# Patient Record
Sex: FEMALE | Race: BLACK OR AFRICAN AMERICAN | NOT HISPANIC OR LATINO | Employment: STUDENT | URBAN - METROPOLITAN AREA
[De-identification: names, ages, dates, MRNs, and addresses within clinical notes are randomized per-mention and may not be internally consistent; named-entity substitution may affect disease eponyms.]

---

## 2024-07-12 ENCOUNTER — APPOINTMENT (OUTPATIENT)
Dept: RADIOLOGY | Facility: HOSPITAL | Age: 29
End: 2024-07-12
Payer: COMMERCIAL

## 2024-07-12 ENCOUNTER — HOSPITAL ENCOUNTER (EMERGENCY)
Facility: HOSPITAL | Age: 29
Discharge: HOME | End: 2024-07-13
Attending: EMERGENCY MEDICINE
Payer: COMMERCIAL

## 2024-07-12 DIAGNOSIS — M89.8X6 PAIN IN LEFT TIBIA: ICD-10-CM
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
## 2024-07-12 LAB
ABO GROUP (TYPE) IN BLOOD: NORMAL
ALBUMIN SERPL BCP-MCNC: 3.7 G/DL (ref 3.4–5)
ALP SERPL-CCNC: 51 U/L (ref 33–110)
ALT SERPL W P-5'-P-CCNC: 9 U/L (ref 7–45)
ANION GAP SERPL CALC-SCNC: 11 MMOL/L (ref 10–20)
ANTIBODY SCREEN: NORMAL
APTT PPP: 28 SECONDS (ref 27–38)
AST SERPL W P-5'-P-CCNC: 13 U/L (ref 9–39)
BASOPHILS # BLD AUTO: 0.02 X10*3/UL (ref 0–0.1)
BASOPHILS NFR BLD AUTO: 0.3 %
BILIRUB SERPL-MCNC: 0.4 MG/DL (ref 0–1.2)
BUN SERPL-MCNC: 12 MG/DL (ref 6–23)
CALCIUM SERPL-MCNC: 8.7 MG/DL (ref 8.6–10.6)
CHLORIDE SERPL-SCNC: 104 MMOL/L (ref 98–107)
CO2 SERPL-SCNC: 27 MMOL/L (ref 21–32)
CREAT SERPL-MCNC: 1 MG/DL (ref 0.5–1.05)
EGFRCR SERPLBLD CKD-EPI 2021: 79 ML/MIN/1.73M*2
EOSINOPHIL # BLD AUTO: 0.12 X10*3/UL (ref 0–0.7)
EOSINOPHIL NFR BLD AUTO: 1.7 %
ERYTHROCYTE [DISTWIDTH] IN BLOOD BY AUTOMATED COUNT: 13.8 % (ref 11.5–14.5)
GLUCOSE SERPL-MCNC: 107 MG/DL (ref 74–99)
HCT VFR BLD AUTO: 37.3 % (ref 36–46)
HGB BLD-MCNC: 12.7 G/DL (ref 12–16)
IMM GRANULOCYTES # BLD AUTO: 0.01 X10*3/UL (ref 0–0.7)
IMM GRANULOCYTES NFR BLD AUTO: 0.1 % (ref 0–0.9)
INR PPP: 1.1 (ref 0.9–1.1)
LYMPHOCYTES # BLD AUTO: 3.59 X10*3/UL (ref 1.2–4.8)
LYMPHOCYTES NFR BLD AUTO: 51.9 %
MCH RBC QN AUTO: 26.9 PG (ref 26–34)
MCHC RBC AUTO-ENTMCNC: 34 G/DL (ref 32–36)
MCV RBC AUTO: 79 FL (ref 80–100)
MONOCYTES # BLD AUTO: 0.77 X10*3/UL (ref 0.1–1)
MONOCYTES NFR BLD AUTO: 11.1 %
NEUTROPHILS # BLD AUTO: 2.41 X10*3/UL (ref 1.2–7.7)
NEUTROPHILS NFR BLD AUTO: 34.9 %
NRBC BLD-RTO: 0 /100 WBCS (ref 0–0)
PLATELET # BLD AUTO: 335 X10*3/UL (ref 150–450)
POTASSIUM SERPL-SCNC: 3.5 MMOL/L (ref 3.5–5.3)
PROT SERPL-MCNC: 7 G/DL (ref 6.4–8.2)
PROTHROMBIN TIME: 12.2 SECONDS (ref 9.8–12.8)
RBC # BLD AUTO: 4.72 X10*6/UL (ref 4–5.2)
RH FACTOR (ANTIGEN D): NORMAL
SODIUM SERPL-SCNC: 138 MMOL/L (ref 136–145)
WBC # BLD AUTO: 6.9 X10*3/UL (ref 4.4–11.3)

## 2024-07-12 PROCEDURE — 73590 X-RAY EXAM OF LOWER LEG: CPT | Mod: LEFT SIDE | Performed by: RADIOLOGY

## 2024-07-12 PROCEDURE — 2500000005 HC RX 250 GENERAL PHARMACY W/O HCPCS: Mod: SE

## 2024-07-12 PROCEDURE — 73590 X-RAY EXAM OF LOWER LEG: CPT | Mod: LT

## 2024-07-12 PROCEDURE — 73552 X-RAY EXAM OF FEMUR 2/>: CPT | Mod: LT

## 2024-07-12 PROCEDURE — 2550000001 HC RX 255 CONTRASTS: Mod: SE | Performed by: EMERGENCY MEDICINE

## 2024-07-12 PROCEDURE — 73610 X-RAY EXAM OF ANKLE: CPT | Mod: LT

## 2024-07-12 PROCEDURE — 36415 COLL VENOUS BLD VENIPUNCTURE: CPT

## 2024-07-12 PROCEDURE — 2500000004 HC RX 250 GENERAL PHARMACY W/ HCPCS (ALT 636 FOR OP/ED): Mod: SE

## 2024-07-12 PROCEDURE — 99284 EMERGENCY DEPT VISIT MOD MDM: CPT | Mod: 25

## 2024-07-12 PROCEDURE — 85025 COMPLETE CBC W/AUTO DIFF WBC: CPT

## 2024-07-12 PROCEDURE — 96374 THER/PROPH/DIAG INJ IV PUSH: CPT

## 2024-07-12 PROCEDURE — 73610 X-RAY EXAM OF ANKLE: CPT | Mod: LEFT SIDE | Performed by: RADIOLOGY

## 2024-07-12 PROCEDURE — 96375 TX/PRO/DX INJ NEW DRUG ADDON: CPT

## 2024-07-12 PROCEDURE — 73564 X-RAY EXAM KNEE 4 OR MORE: CPT | Mod: LT

## 2024-07-12 PROCEDURE — 86850 RBC ANTIBODY SCREEN: CPT

## 2024-07-12 PROCEDURE — 73552 X-RAY EXAM OF FEMUR 2/>: CPT | Mod: LEFT SIDE | Performed by: RADIOLOGY

## 2024-07-12 PROCEDURE — 2500000001 HC RX 250 WO HCPCS SELF ADMINISTERED DRUGS (ALT 637 FOR MEDICARE OP): Mod: SE

## 2024-07-12 PROCEDURE — 80053 COMPREHEN METABOLIC PANEL: CPT

## 2024-07-12 PROCEDURE — 73706 CT ANGIO LWR EXTR W/O&W/DYE: CPT | Mod: LT

## 2024-07-12 PROCEDURE — 73564 X-RAY EXAM KNEE 4 OR MORE: CPT | Mod: LEFT SIDE | Performed by: RADIOLOGY

## 2024-07-12 PROCEDURE — 96376 TX/PRO/DX INJ SAME DRUG ADON: CPT

## 2024-07-12 PROCEDURE — 85610 PROTHROMBIN TIME: CPT

## 2024-07-12 RX ORDER — HYDROMORPHONE HYDROCHLORIDE 1 MG/ML
1 INJECTION, SOLUTION INTRAMUSCULAR; INTRAVENOUS; SUBCUTANEOUS ONCE
Status: COMPLETED | OUTPATIENT
Start: 2024-07-12 | End: 2024-07-12

## 2024-07-12 RX ORDER — KETOROLAC TROMETHAMINE 15 MG/ML
15 INJECTION, SOLUTION INTRAMUSCULAR; INTRAVENOUS ONCE
Status: COMPLETED | OUTPATIENT
Start: 2024-07-12 | End: 2024-07-12

## 2024-07-12 RX ORDER — HYDROXYZINE HYDROCHLORIDE 25 MG/1
25 TABLET, FILM COATED ORAL ONCE
Status: COMPLETED | OUTPATIENT
Start: 2024-07-12 | End: 2024-07-12

## 2024-07-12 RX ORDER — ONDANSETRON HYDROCHLORIDE 2 MG/ML
4 INJECTION, SOLUTION INTRAVENOUS ONCE AS NEEDED
Status: COMPLETED | OUTPATIENT
Start: 2024-07-12 | End: 2024-07-12

## 2024-07-12 RX ORDER — MORPHINE SULFATE 4 MG/ML
4 INJECTION INTRAVENOUS ONCE
Status: COMPLETED | OUTPATIENT
Start: 2024-07-12 | End: 2024-07-12

## 2024-07-12 RX ORDER — METHOCARBAMOL 100 MG/ML
1000 INJECTION, SOLUTION INTRAMUSCULAR; INTRAVENOUS ONCE
Status: COMPLETED | OUTPATIENT
Start: 2024-07-12 | End: 2024-07-12

## 2024-07-12 RX ORDER — ACETAMINOPHEN 325 MG/1
975 TABLET ORAL ONCE
Status: COMPLETED | OUTPATIENT
Start: 2024-07-12 | End: 2024-07-12

## 2024-07-12 RX ORDER — LIDOCAINE 560 MG/1
1 PATCH PERCUTANEOUS; TOPICAL; TRANSDERMAL ONCE
Status: COMPLETED | OUTPATIENT
Start: 2024-07-12 | End: 2024-07-13

## 2024-07-12 RX ADMIN — IOHEXOL 90 ML: 350 INJECTION, SOLUTION INTRAVENOUS at 21:36

## 2024-07-12 RX ADMIN — HYDROMORPHONE HYDROCHLORIDE 1 MG: 1 INJECTION, SOLUTION INTRAMUSCULAR; INTRAVENOUS; SUBCUTANEOUS at 16:19

## 2024-07-12 RX ADMIN — MORPHINE SULFATE 4 MG: 4 INJECTION, SOLUTION INTRAMUSCULAR; INTRAVENOUS at 13:58

## 2024-07-12 RX ADMIN — ACETAMINOPHEN 975 MG: 325 TABLET ORAL at 16:19

## 2024-07-12 RX ADMIN — METHOCARBAMOL 1000 MG: 100 INJECTION INTRAMUSCULAR; INTRAVENOUS at 20:10

## 2024-07-12 RX ADMIN — HYDROMORPHONE HYDROCHLORIDE 1 MG: 1 INJECTION, SOLUTION INTRAMUSCULAR; INTRAVENOUS; SUBCUTANEOUS at 20:10

## 2024-07-12 RX ADMIN — LIDOCAINE 1 PATCH: 4 PATCH TOPICAL at 23:00

## 2024-07-12 RX ADMIN — HYDROXYZINE HYDROCHLORIDE 25 MG: 25 TABLET ORAL at 20:19

## 2024-07-12 RX ADMIN — HYDROMORPHONE HYDROCHLORIDE 1 MG: 1 INJECTION, SOLUTION INTRAMUSCULAR; INTRAVENOUS; SUBCUTANEOUS at 22:59

## 2024-07-12 RX ADMIN — ONDANSETRON 4 MG: 2 INJECTION INTRAMUSCULAR; INTRAVENOUS at 13:58

## 2024-07-12 RX ADMIN — KETOROLAC TROMETHAMINE 15 MG: 15 INJECTION, SOLUTION INTRAMUSCULAR; INTRAVENOUS at 22:59

## 2024-07-12 ASSESSMENT — LIFESTYLE VARIABLES
HAVE PEOPLE ANNOYED YOU BY CRITICIZING YOUR DRINKING: NO
EVER FELT BAD OR GUILTY ABOUT YOUR DRINKING: NO
HAVE YOU EVER FELT YOU SHOULD CUT DOWN ON YOUR DRINKING: NO
EVER HAD A DRINK FIRST THING IN THE MORNING TO STEADY YOUR NERVES TO GET RID OF A HANGOVER: NO
TOTAL SCORE: 0

## 2024-07-12 ASSESSMENT — PAIN DESCRIPTION - LOCATION: LOCATION: KNEE

## 2024-07-12 ASSESSMENT — PAIN SCALES - GENERAL
PAINLEVEL_OUTOF10: 0 - NO PAIN
PAINLEVEL_OUTOF10: 7

## 2024-07-12 ASSESSMENT — PAIN - FUNCTIONAL ASSESSMENT
PAIN_FUNCTIONAL_ASSESSMENT: 0-10
PAIN_FUNCTIONAL_ASSESSMENT: 0-10

## 2024-07-12 ASSESSMENT — COLUMBIA-SUICIDE SEVERITY RATING SCALE - C-SSRS
6. HAVE YOU EVER DONE ANYTHING, STARTED TO DO ANYTHING, OR PREPARED TO DO ANYTHING TO END YOUR LIFE?: NO
2. HAVE YOU ACTUALLY HAD ANY THOUGHTS OF KILLING YOURSELF?: NO
1. IN THE PAST MONTH, HAVE YOU WISHED YOU WERE DEAD OR WISHED YOU COULD GO TO SLEEP AND NOT WAKE UP?: NO
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
## 2024-07-12 NOTE — ED PROVIDER NOTES
"Emergency Department Provider Note        History of Present Illness     History provided by: Patient  Limitations to History: None  External Records Reviewed with Brief Summary:  Note from Diana Ortiz    HPI:  Fransisca Matt is a 28 y.o. female with no pertinent past medical history who is brought in by EMS for fall.  Patient states that she jumped down from 1 to 2 foot ledge, landed on her left foot \"wrong\", states that she felt a twisting sensation associated with extension of the knee.  Patient declines any prior injuries to the leg.  Normally ambulates without any difficulty.  She is not a daily medication    Physical Exam   Triage vitals:  T 36.4 °C (97.6 °F)  HR 80  BP (!) 148/96  RR 16  O2 99 %      GEN:  A&Ox3, moderate pain distress,, appears uncomfortable. Conversational and appropriate.    HEENT: Normocephalic, atraumatic. Conjunctiva pink with no redness or exudates. Hearing grossly intact. Moist mucous membranes.  CARDIO: Normal rate and regular rhythm. Normal S1, S2  without murmurs, rubs, or gallops.   PULM: Clear to auscultation bilaterally. No rales, rhonchi, or wheezes. No accessory muscle use or stridor.  GI: Soft, non-tender, non-distended. No rebound tenderness or guarding.   SKIN: Warm and dry, no rashes, lesions, petechiae, or purpura.  MSK: Limited full range of motion of the left knee in part due to pain, noting posterior tenderness to palpation. No unilateral swelling, or any erythema.  2+ pulse DP pulse in the right lower extremity, difficulty with palpation of the left DP pulse, in part due to significant tenderness to palpation, and patient shaking.  Able to Doppler left DP pulse, in addition to normal ABIs (0.9 bilaterally).  NEURO: No focal findings identified. No confusion or gross mental status changes.  PSYCH: Appropriate mood and behavior, converses and responds appropriately during exam.    Medical Decision Making & ED Course   Medical Decision Makin y.o. female " presented to the emergency department after a fall, with initial concern for popliteal artery aneurysm, however had bilaterally normal ABIs, with palpable dopplerable pulses.  X-rays did not show any acute findings to suggest fracture.  More likely to be a ligamentous knee injury.  Will attempt to ambulate patient after pain control, if unable to ambulate, would consider CT lower extremity to rule out occult fracture.  Signed out to oncoming provider pending ambulation.   ----      Differential diagnoses considered include but are not limited to: Popliteal artery injury, ligamentous knee injury, patellar fracture, distal tibia/fibula or femur fracture.     Social Determinants of Health which Significantly Impact Care: None identified     EKG Independent Interpretation: EKG not obtained    Independent Result Review and Interpretation: Relevant laboratory and radiographic results were reviewed and independently interpreted by myself.  As necessary, they are commented on in the ED Course.    Chronic conditions affecting the patient's care: As documented above in [facility]    The patient was discussed with the following consultants/services: None    Care Considerations: As documented above in [facility]    ED Course:  Diagnoses as of 07/12/24 1614   Fall, initial encounter   Pain in left tibia     Disposition   Patient was signed out to Dr. Rogers at 1700 pending ambulation trial, however likely discharge home.  Please see the next provider's transition of care note for the remainder of the patient's care.     Procedures   Procedures    Patient seen and discussed with ED attending physician.    María Elena Sparks DO  Emergency Medicine       María Elena Sparks DO  Resident  07/12/24 1614

## 2024-07-13 VITALS
BODY MASS INDEX: 51.91 KG/M2 | DIASTOLIC BLOOD PRESSURE: 63 MMHG | HEIGHT: 63 IN | WEIGHT: 293 LBS | HEART RATE: 103 BPM | TEMPERATURE: 97.6 F | SYSTOLIC BLOOD PRESSURE: 108 MMHG | RESPIRATION RATE: 18 BRPM | OXYGEN SATURATION: 99 %

## 2024-07-13 PROCEDURE — 2500000001 HC RX 250 WO HCPCS SELF ADMINISTERED DRUGS (ALT 637 FOR MEDICARE OP): Mod: SE

## 2024-07-13 PROCEDURE — 99221 1ST HOSP IP/OBS SF/LOW 40: CPT | Performed by: SURGERY

## 2024-07-13 RX ORDER — IBUPROFEN 800 MG/1
800 TABLET ORAL 3 TIMES DAILY
Qty: 30 TABLET | Refills: 0 | Status: SHIPPED | OUTPATIENT
Start: 2024-07-13 | End: 2024-07-13

## 2024-07-13 RX ORDER — ACETAMINOPHEN 325 MG/1
325 TABLET ORAL EVERY 6 HOURS PRN
Qty: 30 TABLET | Refills: 0 | Status: SHIPPED | OUTPATIENT
Start: 2024-07-13

## 2024-07-13 RX ORDER — OXYCODONE HYDROCHLORIDE 5 MG/1
5 TABLET ORAL ONCE
Status: COMPLETED | OUTPATIENT
Start: 2024-07-13 | End: 2024-07-13

## 2024-07-13 RX ORDER — OXYCODONE AND ACETAMINOPHEN 5; 325 MG/1; MG/1
1 TABLET ORAL ONCE
Status: COMPLETED | OUTPATIENT
Start: 2024-07-13 | End: 2024-07-13

## 2024-07-13 RX ORDER — IBUPROFEN 800 MG/1
800 TABLET ORAL 3 TIMES DAILY
Qty: 30 TABLET | Refills: 0 | Status: SHIPPED | OUTPATIENT
Start: 2024-07-13

## 2024-07-13 RX ORDER — OXYCODONE AND ACETAMINOPHEN 5; 325 MG/1; MG/1
1 TABLET ORAL EVERY 12 HOURS PRN
Qty: 6 TABLET | Refills: 0 | Status: SHIPPED | OUTPATIENT
Start: 2024-07-13 | End: 2024-07-16

## 2024-07-13 RX ADMIN — OXYCODONE HYDROCHLORIDE 5 MG: 5 TABLET ORAL at 02:01

## 2024-07-13 RX ADMIN — OXYCODONE HYDROCHLORIDE AND ACETAMINOPHEN 1 TABLET: 5; 325 TABLET ORAL at 08:51

## 2024-07-13 ASSESSMENT — PAIN SCALES - GENERAL: PAINLEVEL_OUTOF10: 8

## 2024-07-13 ASSESSMENT — PAIN - FUNCTIONAL ASSESSMENT: PAIN_FUNCTIONAL_ASSESSMENT: 0-10
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
## 2024-07-13 NOTE — DISCHARGE INSTRUCTIONS
There is no evidence of fracture.  You may have had a ligamentous injury.  We recommend using the knee immobilizer and crutches until you are comfortable bearing weight on it.  We recommend following up with orthopedic injury clinic if you continue to have significant pain.    --   Orthopedic Injury Clinic   [facility] [place]  [facility]  [address]  [address] Floor, [address]  [place] [address]  Phone: [phone]    Open Monday through Friday, 8:30 a.m. - 4:00 p.m.

## 2024-07-13 NOTE — PROGRESS NOTES
Emergency Medicine Transition of Care Note.    I received Fransisca Matt in signout from previous team.  Please see the previous ED provider note for all HPI, PE and MDM up to the time of signout at 0700. This is in addition to the primary record.    In brief Fransisca Matt is an 28 y.o. female presenting for Fall and left lower extremity/knee injury.  At the time of signout we were awaiting: Vascular surgery final recommendations.    Briefly, this patient experienced a fall where she she experienced sudden intense left knee pain.  X-ray imaging Of the left knee, ankle, femur and tib/fib were all negative.  There was some concerns for weak pulses in her lower extremity so she received a CTA of the left lower extremity which unfortunately had poor contrast timing and nondiagnostic.  It did show a moderate left knee joint effusion though bony structures were not intact.  Vascular surgery was consulted due to the poor pulses and nondiagnostic CT.  At the end of prior providers shift, currently awaiting final recommendations.  Point-of-care ABIs were negative.    Some discussion that the patient may need formal ABIs per vascular surgery.  They discussed with their attending and are not recommending any formal ABIs at this time as on their exam, pulses were strong.  Patient was placed in a knee immobilizer and given crutches.  She was given referral to injury clinic for further evaluation of her knee pain/joint effusion for concerns of a ligamentous tear.    ED Course as of 07/13/24 0951   Fri Jul 12, 2024   1650 Patient attempted to ambulate and was unable to bear weight on the left lower extremity.  Given the patient's significant pain and difficulty with obtaining pulses initially will get a CT angiogram of the left lower extremity to evaluate for popliteal injury versus aneurysm as well as occult fracture within the knee compartment.  Patient updated with this plan and agreeable with this. [RS]   2004 I was called to  the patient's bedside for significant pain in her left lower extremity.  Patient is complaining of pain around the left knee and into the left calf.  Does feel like this is a throbbing pain as well as muscle tightness.  Will give her a repeat dose of Dilaudid as well as Robaxin.  Patient's neurovascular status has been reevaluated and she does have some slowed cap refill on the left lower extremity when compared to the right which further raises suspicion for possible vasculature injury.  DP pulse is still palpable. [RS]   2238 Patient signed out to Juan Ramon Tillman MD in stable condition pending CT imaging read and final disposition.  Patient is requesting additional pain medication at this time and repeat dose of Dilaudid was placed as well as lidocaine patch and Toradol.  See oncoming provider note for further details. [RS]      ED Course User Index  [RS] Bebeto Rogers, DO         Diagnoses as of 07/13/24 0951   Fall, initial encounter   Pain in left tibia       Labs Reviewed   CBC WITH AUTO DIFFERENTIAL - Abnormal       Result Value    WBC 6.9      nRBC 0.0      RBC 4.72      Hemoglobin 12.7      Hematocrit 37.3      MCV 79 (*)     MCH 26.9      MCHC 34.0      RDW 13.8      Platelets 335      Neutrophils % 34.9      Immature Granulocytes %, Automated 0.1      Lymphocytes % 51.9      Monocytes % 11.1      Eosinophils % 1.7      Basophils % 0.3      Neutrophils Absolute 2.41      Immature Granulocytes Absolute, Automated 0.01      Lymphocytes Absolute 3.59      Monocytes Absolute 0.77      Eosinophils Absolute 0.12      Basophils Absolute 0.02     COMPREHENSIVE METABOLIC PANEL - Abnormal    Glucose 107 (*)     Sodium 138      Potassium 3.5      Chloride 104      Bicarbonate 27      Anion Gap 11      Urea Nitrogen 12      Creatinine 1.00      eGFR 79      Calcium 8.7      Albumin 3.7      Alkaline Phosphatase 51      Total Protein 7.0      AST 13      Bilirubin, Total 0.4      ALT 9     COAGULATION SCREEN - Normal     Protime 12.2      INR 1.1      aPTT 28      Narrative:     The APTT is no longer used for monitoring Unfractionated Heparin Therapy. For monitoring Heparin Therapy, use the Heparin Assay.   TYPE AND SCREEN    ABO TYPE A      Rh TYPE POS      ANTIBODY SCREEN NEG         CT angio lower extremity left w and or wo IV contrast   Final Result   1.Unfortunately, the vascular portion of the examination is   nearly nondiagnostic. Due to patient size and timing of the contrast   the arterial opacification is extremely poor. The aorta and left iliac   arteries appear to enhance normally. Beyond this there is inadequate   arterial opacification for detailed evaluation. The left popliteal   artery is normal in caliber. There is no evidence of popliteal artery   aneurysm.   2.Moderate left knee joint effusion. Bony structures of the knee   appear intact.   Signed by Alexander العلي MD      XR tibia fibula left 2 views   Final Result   Normal radiograph of left tibia/fibula.        MACRO:   I personally reviewed the images/study and I agree with the findings   as stated. This study was interpreted at [place], [place].        Signed by: Jennifer Iyer 7/12/2024 1:45 PM   Dictation workstation:   [id number]      XR femur left 2+ views   Final Result   Normal radiograph of left femur.        MACRO:   I personally reviewed the images/study and I agree with the findings   as stated. This study was interpreted at [place], [place].        Signed by: Jennifer Iyer 7/12/2024 1:45 PM   Dictation workstation:   [id number]      XR ankle left 3+ views   Final Result   No acute fracture or malalignment of the left ankle.        MACRO:   I personally reviewed the images/study and I agree with the findings   as stated. This study was interpreted at [place], [place].        Signed by: Jennifer Iyer  7/12/2024 1:43 PM   Dictation workstation:   [id number]      XR knee left 4+ views   Final Result   No acute fracture or malalignment of the left knee. If there is   concern for soft tissue injury, nonemergent MRI may be obtained        MACRO:   I personally reviewed the images/study and I agree with the findings   as stated. This study was interpreted at [place], [place].        Signed by: Jennifer Iyer 7/12/2024 1:42 PM   Dictation workstation:   [id number]      Point of Care Ultrasound    (Results Pending)   CT angio lower extremity left w and or wo IV contrast    (Results Pending)         Medical Decision Making      Final diagnoses:   [W19.XXXA] Fall, initial encounter   [M89.8X6] Pain in left tibia           Procedure  Procedures    Pati Leggett PA-C

## 2024-07-13 NOTE — PROGRESS NOTES
Patient was handed off to me from the previous team. For full history, physical, and prior ED course, please see previous provider note prior to patient handoff. This is an addendum to the record.    HPI/prior hospital course:   In brief, patient is a 20-year-old female with past medical history of obesity presenting after her left knee buckled going downstairs with a twisting motion and extension mechanism.  X-rays without fractures.  Concern for prior provider for diminished but dopplerable pulses in the left lower extremity.  Patient handed off pending radiology evaluation and CT angio with concern that CT angio of the abdomen and adequately contrast load.  Reportedly unable to walk at time of handoff.  Hospital Course/MDM:  CT angio was nondiagnostic due to timing of contrast load.  Patient reassessed and continued to have subjective pulse difference left versus right but roughly symmetric dopplerable pulses.  Vascular surgery consulted for additional recommendations.  Vascular surgery did not recommend any acute intervention other than repeat ABIs. ABIs were 1.06 in the left leg at time of assessment.  Patient otherwise with no significant joint laxity on physical exam on re-assessment.  Patient placed in knee immobilizer and provided crutches.  Able to ambulate with assistance of crutches.  Findings discussed with patient as well as appropriate follow-up with orthopedic surgery if she continues to have symptoms.  Return precaution discussed with patient and patient discharged home.    Disposition:  Discharged home    Patient seen and discussed with Dr. Amena Caldwell MD, PhD  Emergency Medicine PGY2

## 2024-07-15 ENCOUNTER — TELEPHONE (OUTPATIENT)
Dept: ORTHOPEDIC SURGERY | Facility: HOSPITAL | Age: 29
End: 2024-07-15
Payer: COMMERCIAL

## 2024-07-15 ENCOUNTER — OFFICE VISIT (OUTPATIENT)
Dept: ORTHOPEDIC SURGERY | Facility: HOSPITAL | Age: 29
End: 2024-07-15
Payer: COMMERCIAL

## 2024-07-15 VITALS — BODY MASS INDEX: 51.91 KG/M2 | WEIGHT: 293 LBS | HEIGHT: 63 IN

## 2024-07-15 DIAGNOSIS — S83.92XA SPRAIN OF LEFT KNEE, INITIAL ENCOUNTER: ICD-10-CM

## 2024-07-15 PROCEDURE — 99213 OFFICE O/P EST LOW 20 MIN: CPT | Performed by: ORTHOPAEDIC SURGERY

## 2024-07-15 PROCEDURE — 99203 OFFICE O/P NEW LOW 30 MIN: CPT | Performed by: ORTHOPAEDIC SURGERY

## 2024-07-15 RX ORDER — MELOXICAM 15 MG/1
15 TABLET ORAL DAILY
Qty: 14 TABLET | Refills: 0 | Status: SHIPPED | OUTPATIENT
Start: 2024-07-15 | End: 2025-07-15

## 2024-07-15 NOTE — TELEPHONE ENCOUNTER
Copied from CRM #[id number]. Topic: Appointment Scheduled  >> Jul 15, 2024  8:19 AM Pretty GILL wrote:  Dr. Arroyo- Same day add on
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
